# Patient Record
Sex: MALE | Race: WHITE | Employment: FULL TIME | URBAN - METROPOLITAN AREA
[De-identification: names, ages, dates, MRNs, and addresses within clinical notes are randomized per-mention and may not be internally consistent; named-entity substitution may affect disease eponyms.]

---

## 2022-09-11 ENCOUNTER — HOSPITAL ENCOUNTER (EMERGENCY)
Age: 47
Discharge: HOME | End: 2022-09-11
Attending: EMERGENCY MEDICINE

## 2022-09-11 VITALS
HEART RATE: 100 BPM | RESPIRATION RATE: 18 BRPM | TEMPERATURE: 98 F | SYSTOLIC BLOOD PRESSURE: 153 MMHG | DIASTOLIC BLOOD PRESSURE: 83 MMHG | OXYGEN SATURATION: 100 %

## 2022-09-11 DIAGNOSIS — T15.92XA FB EYE, LEFT, INITIAL ENCOUNTER: Primary | ICD-10-CM

## 2022-09-11 PROCEDURE — 6370000000 HC RX 637 (ALT 250 FOR IP): Performed by: PHYSICIAN ASSISTANT

## 2022-09-11 PROCEDURE — 99283 EMERGENCY DEPT VISIT LOW MDM: CPT

## 2022-09-11 RX ORDER — MOXIFLOXACIN 5 MG/ML
SOLUTION/ DROPS OPHTHALMIC
Qty: 1 EACH | Refills: 0 | Status: SHIPPED | OUTPATIENT
Start: 2022-09-11

## 2022-09-11 RX ORDER — TETRACAINE HYDROCHLORIDE 5 MG/ML
1 SOLUTION OPHTHALMIC ONCE
Status: COMPLETED | OUTPATIENT
Start: 2022-09-11 | End: 2022-09-11

## 2022-09-11 RX ADMIN — FLUORESCEIN SODIUM 2 EACH: 0.6 STRIP OPHTHALMIC at 18:48

## 2022-09-11 RX ADMIN — TETRACAINE HYDROCHLORIDE 1 DROP: 5 SOLUTION OPHTHALMIC at 18:48

## 2022-09-11 ASSESSMENT — PAIN DESCRIPTION - PAIN TYPE: TYPE: ACUTE PAIN

## 2022-09-11 ASSESSMENT — VISUAL ACUITY
OD: 20/50
OS: 20/100

## 2022-09-11 ASSESSMENT — LIFESTYLE VARIABLES: HOW OFTEN DO YOU HAVE A DRINK CONTAINING ALCOHOL: NEVER

## 2022-09-11 ASSESSMENT — PAIN DESCRIPTION - ORIENTATION: ORIENTATION: LEFT

## 2022-09-11 ASSESSMENT — PAIN SCALES - GENERAL: PAINLEVEL_OUTOF10: 5

## 2022-09-11 ASSESSMENT — PAIN - FUNCTIONAL ASSESSMENT: PAIN_FUNCTIONAL_ASSESSMENT: 0-10

## 2022-09-11 ASSESSMENT — PAIN DESCRIPTION - LOCATION: LOCATION: EYE

## 2022-09-11 ASSESSMENT — PAIN DESCRIPTION - FREQUENCY: FREQUENCY: INTERMITTENT

## 2022-09-11 NOTE — ED PROVIDER NOTES
ED Attending shared visit  CC: No     Department of Emergency Medicine   ED  Encounter Note  Admit Date/RoomTime: 2022  6:08 PM  ED Room: [place] [address]    NAME: Lorena Gray  : 1975  MRN: [id number]     Chief Complaint:  Eye Pain (Left eye pain intermittent x 1 week, reddened, sensitive to light and swollen)    History of Present Illness        Lorena Gray is a 55 y.o. old male presenting to the emergency department by private vehicle, for traumatic sudden onset erythema, foreign body sensation, pain, photophobia, and tearing to left eye, which began 1 week(s) prior to arrival.  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 Since onset his symptoms have been remaining constant and severe in severity. Associated signs & symptoms of: nothing additional. The patients tetanus status is within past 5 years. History of:     []   Glaucoma     []   Recent Eye Surgery     ROS   Pertinent positives and negatives are stated within HPI, all other systems reviewed and are negative. Past Medical History:  has no past medical history on file. Surgical History:  has no past surgical history on file. Social History:  reports that he has been smoking cigarettes. He has been smoking an average of 2 packs per day. He has never used smokeless tobacco. He reports that he does not currently use alcohol. He reports that he does not use drugs. Family History: family history is not on file. Allergies: Patient has no known allergies. Physical Exam   Oxygen Saturation Interpretation: Normal.        ED Triage Vitals   BP Temp Temp src Heart Rate Resp SpO2 Height Weight   22 1755 22 1736 -- 22 1736 22 1736 22 1736 -- --   137/83 98 °F (36.7 °C)  (!) 120 20 96 %         Physical Exam  Constitutional:  Alert, development consistent with age. HENT:  NC/NT. Airway patent. Neck:  Normal ROM. Supple. Eyes:         Pupils: equal, round, reactive to light and accommodation. Eyelids: Bilateral upper and lower Swelling/redness:  no swelling or erythema. Conjunctiva: Left injected(red). Sclera: Left non-icteric . Cornea: Left FB at the 11 o' clock position with rust ring present. EOM:  Intact Bilaterally. Fundoscopic:  grossly normal- discs sharp. Visual Acuity: 20/50 right, 20/100 left. Integument:  No rashes, erythema present, unless noted elsewhere. Lymphatics: No lymphangitis or adenopathy noted. Neurological:  Oriented. Motor functions intact. **Informed Consent**    The patient has given verbal consent to have photos taken of left eye with FB and electronically inserted into their ED Provider Note as part of their permanent medical record for purposes of illustration, documentation, treatment management and/or medical review. All Images taken on 9/11/22 of patient name: Margie Duran were taken by a [facility] approved registered mobile device via [employer] mobile application and transmitted then stored on a secured [employer] located within [facility]. Lab / Imaging Results   (All laboratory and radiology results have been personally reviewed by myself)  Labs:  No results found for this visit on 09/11/22. Imaging: All Radiology results interpreted by Radiologist unless otherwise noted. No orders to display     ED Course / Medical Decision Making     Medications   tetracaine (TETRAVISC) 0.5 % ophthalmic solution 1 drop (1 drop Left Eye Given 9/11/22 1848)   fluorescein ophthalmic strip 2 each (2 each Both Eyes Given 9/11/22 1848)        Re-examination:  9/11/22       Time: 1845  Patients condition is improving after treatment. Consult(s):   None    Procedure(s):     PROCEDURE  9/11/22       Time: 1830    FOREIGN BODY REMOVAL  Risks, benefits and alternatives (for applicable procedures below) described. Performed By: Anastacio Palumbo PA-C.  And Dr. Aj Manuel    Location:   Foreign body of Left eye. Informed consent: Verbal consent obtained. The patient was counseled regarding the procedure in person, it's indications, risks, potential complications and alternatives and any questions were answered. Verbal consent was obtained. Skin Prep:  none  required. Local Anesthesia:  tetracaine. The patient's head was positioned appropriately and the foreign body was removed using 18 gauge needle. .  Complications: none. Patient tolerated the procedure well. MDM:   Patient presents to the emergency department for suspected foreign body of the left eye. Foreign body was identified at the 11 o'clock position, which was easily removed in the ER. There was a rust ring present after the procedure was complete and the patient understands that he must follow-up with ophthalmology to have this removed. Patient will be given moxifloxacin and should call ophthalmology tomorrow to schedule close follow-up appointment. Return for any new or worsening symptoms. Plan of Care/Counseling:  Geoffrey Holter, PA-C reviewed today's visit with the patient in addition to providing specific details for the plan of care and counseling regarding the diagnosis and prognosis. Questions are answered at this time and are agreeable with the plan. Assessment      1. FB eye, left, initial encounter      Plan   Discharged home. Patient condition is good    New Medications     New Prescriptions    MOXIFLOXACIN (VIGAMOX) 0.5 % OPHTHALMIC SOLUTION    Use 2 drops to affected eye TID for 5 days     Electronically signed by Geoffrey Holter, PA-C   DD: 9/11/22  **This report was transcribed using voice recognition software. Every effort was made to ensure accuracy; however, inadvertent computerized transcription errors may be present.   END OF ED PROVIDER NOTE        Geoffrey Holter, PA-C  09/11/22 1432

## 2022-09-11 NOTE — Clinical Note
Rabia Gonzales was seen and treated in our emergency department on 9/11/2022. He may return to work on 09/12/2022. If you have any questions or concerns, please don't hesitate to call.       Dasia Chacon PA-C

## 2022-11-02 ENCOUNTER — HOSPITAL ENCOUNTER (EMERGENCY)
Age: 47
Discharge: HOME | End: 2022-11-02
Attending: EMERGENCY MEDICINE

## 2022-11-02 VITALS
SYSTOLIC BLOOD PRESSURE: 142 MMHG | RESPIRATION RATE: 16 BRPM | OXYGEN SATURATION: 100 % | HEART RATE: 97 BPM | DIASTOLIC BLOOD PRESSURE: 96 MMHG | TEMPERATURE: 98.5 F

## 2022-11-02 DIAGNOSIS — T15.92XA FB EYE, LEFT, INITIAL ENCOUNTER: Primary | ICD-10-CM

## 2022-11-02 PROCEDURE — 99283 EMERGENCY DEPT VISIT LOW MDM: CPT

## 2022-11-02 PROCEDURE — 65222 REMOVE FOREIGN BODY FROM EYE: CPT

## 2022-11-02 PROCEDURE — 6370000000 HC RX 637 (ALT 250 FOR IP): Performed by: PHYSICIAN ASSISTANT

## 2022-11-02 RX ORDER — TETRACAINE HYDROCHLORIDE 5 MG/ML
2 SOLUTION OPHTHALMIC ONCE
Status: COMPLETED | OUTPATIENT
Start: 2022-11-02 | End: 2022-11-02

## 2022-11-02 RX ORDER — KETOROLAC TROMETHAMINE 5 MG/ML
1 SOLUTION OPHTHALMIC 4 TIMES DAILY
Qty: 5 ML | Refills: 0 | Status: SHIPPED | OUTPATIENT
Start: 2022-11-02 | End: 2022-11-09

## 2022-11-02 RX ORDER — ERYTHROMYCIN 5 MG/G
OINTMENT OPHTHALMIC
Qty: 3.5 G | Refills: 0 | Status: SHIPPED | OUTPATIENT
Start: 2022-11-02 | End: 2022-11-12

## 2022-11-02 RX ORDER — ERYTHROMYCIN 5 MG/G
OINTMENT OPHTHALMIC ONCE
Status: COMPLETED | OUTPATIENT
Start: 2022-11-02 | End: 2022-11-02

## 2022-11-02 RX ADMIN — FLUORESCEIN SODIUM 1 EACH: 0.6 STRIP OPHTHALMIC at 09:23

## 2022-11-02 RX ADMIN — TETRACAINE HYDROCHLORIDE 2 DROP: 5 SOLUTION OPHTHALMIC at 09:23

## 2022-11-02 RX ADMIN — ERYTHROMYCIN: 5 OINTMENT OPHTHALMIC at 09:23

## 2022-11-02 ASSESSMENT — PAIN - FUNCTIONAL ASSESSMENT: PAIN_FUNCTIONAL_ASSESSMENT: 0-10

## 2022-11-02 ASSESSMENT — PAIN SCALES - GENERAL: PAINLEVEL_OUTOF10: 3

## 2022-11-02 NOTE — Clinical Note
David Perales was seen and treated in our emergency department on 11/2/2022. He may return to work on 11/03/2022. If you have any questions or concerns, please don't hesitate to call.       VIDAL Easton

## 2022-11-02 NOTE — ED NOTES
Visual acuity completed on patient. Left eye 20/30  Right eye 20/100  Patient does state that he wear glasses always but does not have them right now. PA notified.       Mónica Westbrook RN  11/02/22 1013

## 2022-11-02 NOTE — ED PROVIDER NOTES
ED Attending shared visit  CC: No     HPI:  11/2/22, Time: 8:44 AM EDT         Royal Sanders is a 52 y.o. male presenting to the ED for left eye pain erythema , beginning 4 Days  ago. The complaint has been persistent, moderate in severity, and worsened by light . patient comes in with complaint of left eye pain and erythema that started 4 days ago. He states he had a foreign body in the eye approximately 2 weeks ago was placed on antibiotic at that time and symptoms did resolve. He did not follow-up with ophthalmology. Now having sharp shooting pain of the eye with some sensitivity to light. He denies any pain behind the eye. The eye pain does cause him to have headache with some blurring of vision. No loss of vision. Denies any drainage from the eye. Patient does not wear any contacts. Unsure of any recent foreign body to the eye. No injury to the eye. Denies any fever congestion cough. Patient's tetanus is up-to-date. Review of Systems:   A complete review of systems was performed and pertinent positives and negatives are stated within HPI, all other systems reviewed and are negative.          --------------------------------------------- PAST HISTORY ---------------------------------------------  Past Medical History:  has no past medical history on file. Past Surgical History:  has no past surgical history on file. Social History:  reports that he has been smoking cigarettes. He has been smoking an average of 2 packs per day. He has never used smokeless tobacco. He reports that he does not currently use alcohol. He reports that he does not use drugs. Family History: family history is not on file. The patients home medications have been reviewed. Allergies: Patient has no known allergies.     -------------------------------------------------- RESULTS -------------------------------------------------  All laboratory and radiology results have been personally reviewed by myself   LABS:  No results found for this visit on 11/02/22. RADIOLOGY:  Interpreted by Radiologist.  No orders to display       ------------------------- NURSING NOTES AND VITALS REVIEWED ---------------------------   The nursing notes within the ED encounter and vital signs as below have been reviewed. BP (!) 142/96   Pulse 97   Temp 98.5 °F (36.9 °C)   Resp 16   SpO2 100%   Oxygen Saturation Interpretation: Normal      ---------------------------------------------------PHYSICAL EXAM--------------------------------------      Constitutional/General: Alert and oriented x3, well appearing, non toxic in NAD  Head: Normocephalic and atraumatic  Eyes: PERRL, EOMI left eye with conjunctival erythema there is fluorescein uptake at 2:00 with foreign body present 2:00 laterally to the pupil. Eyelids were everted no foreign bodies present left eye pressure is 8 right eye pressure of 11  Mouth: Oropharynx clear, handling secretions, no trismus  Neck: Supple, full ROM,   Pulmonary: Lungs clear to auscultation bilaterally, no wheezes, rales, or rhonchi. Not in respiratory distress  Cardiovascular:  Regular rate and rhythm, no murmurs, gallops, or rubs. 2+ distal pulses  Abdomen: Soft, non tender, non distended,   Extremities: Moves all extremities x 4.  Warm and well perfused  Skin: warm and dry without rash  Neurologic: GCS 15,  Psych: Normal Affect      ------------------------------ ED COURSE/MEDICAL DECISION MAKING----------------------  Medications   tetracaine (TETRAVISC) 0.5 % ophthalmic solution 2 drop (2 drops Ophthalmic Given 11/2/22 0923)   erythromycin [facility]) ophthalmic ointment ( Left Eye Given 11/2/22 7950)   fluorescein ophthalmic strip 1 each (1 each Right Eye Given 11/2/22 9282)         ED COURSE:  ED Course as of 11/02/22 1058   Wed Nov 02, 2022   [facility]:     I have personally performed and/or participated in the history, exam, medical decision making, and procedures and agree with all pertinent clinical information unless otherwise noted. I have also reviewed and agree with the past medical, family and social history unless otherwise noted. History: OS foreign body    My findings: Conrado Singh is a 52 y.o. male whom is in no distress. Physical exam reveals erythema of the conjunctiva. No discharge. EOMI. No facial erythema or edema. My plan: Symptomatic and supportive care. Patient had a FB removed by the PA. He will be placed on antibiotic ointment and will follow up with optho. [JS]      ED Course User Index  [JS] Radha Garcai DO     SLIT LAMP EXAM:  Performed By: Harris Severe, PA. Left Eye: Cornea: a 1.0 to 2.0 mm foreign body with the appearance of metal was removed using an eye spud. Flourescein stain: Negative. Anterior chamber: normal.        Medical Decision Making:    Patient comes in with complaint of left thigh erythema pain that started 4 days ago. He had history of foreign body of the left eye at 11:00 2 weeks ago that he states did improve he had not followed up with ophthalmology today patient with foreign body of the left eye at approximately 2:00 removed with an eye bur. Patient was placed on erythromycin and is to follow-up with ophthalmology today or tomorrow. Patient was not wearing his prescription glasses secondary to scratches of the glass for visual acuity. Counseling: The emergency provider has spoken with the patient and discussed todays results, in addition to providing specific details for the plan of care and counseling regarding the diagnosis and prognosis.   Questions are answered at this time and they are agreeable with the plan.      --------------------------------- IMPRESSION AND DISPOSITION ---------------------------------    IMPRESSION  1. FB eye, left, initial encounter        DISPOSITION  Disposition: Discharge to home  Patient condition is good      NOTE: This report was transcribed using voice recognition software.  Every effort was made to ensure accuracy; however, inadvertent computerized transcription errors may be present      [place], [place]  11/02/22 8129

## 2023-08-21 ENCOUNTER — HOSPITAL ENCOUNTER (EMERGENCY)
Age: 48
Discharge: HOME | End: 2023-08-21
Attending: STUDENT IN AN ORGANIZED HEALTH CARE EDUCATION/TRAINING PROGRAM

## 2023-08-21 VITALS
HEIGHT: 63 IN | SYSTOLIC BLOOD PRESSURE: 133 MMHG | HEART RATE: 106 BPM | WEIGHT: 165 LBS | RESPIRATION RATE: 16 BRPM | DIASTOLIC BLOOD PRESSURE: 83 MMHG | BODY MASS INDEX: 29.23 KG/M2 | TEMPERATURE: 98.4 F | OXYGEN SATURATION: 98 %

## 2023-08-21 DIAGNOSIS — S05.01XA BILATERAL CORNEAL ABRASIONS, INITIAL ENCOUNTER: ICD-10-CM

## 2023-08-21 DIAGNOSIS — S05.02XA BILATERAL CORNEAL ABRASIONS, INITIAL ENCOUNTER: ICD-10-CM

## 2023-08-21 DIAGNOSIS — S05.52XA: Primary | ICD-10-CM

## 2023-08-21 DIAGNOSIS — S05.51XA: Primary | ICD-10-CM

## 2023-08-21 PROCEDURE — 6370000000 HC RX 637 (ALT 250 FOR IP): Performed by: STUDENT IN AN ORGANIZED HEALTH CARE EDUCATION/TRAINING PROGRAM

## 2023-08-21 PROCEDURE — 99283 EMERGENCY DEPT VISIT LOW MDM: CPT

## 2023-08-21 RX ORDER — ERYTHROMYCIN 5 MG/G
OINTMENT OPHTHALMIC
Status: COMPLETED | OUTPATIENT
Start: 2023-08-21 | End: 2023-08-21

## 2023-08-21 RX ADMIN — FLUORESCEIN SODIUM 1 MG: 1 STRIP OPHTHALMIC at 04:31

## 2023-08-21 RX ADMIN — ERYTHROMYCIN: 5 OINTMENT OPHTHALMIC at 04:44

## 2023-08-21 ASSESSMENT — PAIN DESCRIPTION - ORIENTATION
ORIENTATION: RIGHT;LEFT
ORIENTATION: RIGHT;LEFT

## 2023-08-21 ASSESSMENT — PAIN - FUNCTIONAL ASSESSMENT
PAIN_FUNCTIONAL_ASSESSMENT: 0-10
PAIN_FUNCTIONAL_ASSESSMENT: 0-10

## 2023-08-21 ASSESSMENT — PAIN DESCRIPTION - DESCRIPTORS: DESCRIPTORS: BURNING

## 2023-08-21 ASSESSMENT — PAIN DESCRIPTION - PAIN TYPE: TYPE: ACUTE PAIN

## 2023-08-21 ASSESSMENT — PAIN SCALES - GENERAL
PAINLEVEL_OUTOF10: 10
PAINLEVEL_OUTOF10: 8

## 2023-08-21 ASSESSMENT — PAIN DESCRIPTION - LOCATION
LOCATION: EYE
LOCATION: EYE

## 2023-08-21 ASSESSMENT — PAIN DESCRIPTION - FREQUENCY: FREQUENCY: CONTINUOUS

## 2023-08-21 NOTE — ED PROVIDER NOTES
Department of Emergency Medicine     Written by: Shilpa Wallace DO  Patient Name: Kendrick Uribe Date: 2023  3:56 AM  MRN: [id number]                   : 1975      HPI  Chief Complaint   Patient presents with    Eye Problem     Bilateral redness and burning to eyes. Pt denies itchiness and reports gooey drainage two days ago. Pt thought he had something in his right eye and got it out. This a 51-year-old male with no relevant past medical history on file who presents the emergency department today complaining of bilateral eye pain. Patient states approximately 3 days ago he noticed some right eye pain and thought he had something in his eye. 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 He had some discharge out of the right approximately 2 days ago but he does not recall what it looked like. This morning he was in the garage doing some metal grinding again and then noticed he had severe pain and burning with redness of both eyes. Denies headache, chest pain, shortness of breath, abdominal pain, dizziness, lightheadedness. He states his last tetanus shot was about 8 or 9 months ago. He does not wear contact lenses. Review of systems:    Pertinent positives and negatives mentioned in the HPI/MDM. Physical Exam  Constitutional:       General: He is not in acute distress. HENT:      Head: Normocephalic and atraumatic. Eyes:      Extraocular Movements: Extraocular movements intact. Conjunctiva/sclera:      Right eye: Right conjunctiva is injected. Left eye: Left conjunctiva is injected. Pupils: Pupils are equal, round, and reactive to light. Right eye: Fluorescein uptake present. Comments: Multiple corneal abrasions noted bilaterally as well as small dark blackish flakes/foreign body on the conjunctiva   Cardiovascular:      Rate and Rhythm: Normal rate and regular rhythm.    Pulmonary:      Effort: Pulmonary effort is

## 2023-08-21 NOTE — DISCHARGE INSTRUCTIONS
Call the ophthalmologist as soon as they open, if they are unable to see you please go to [facility] emergency department so that ophthalmologist there may evaluate you. Apply half-inch ribbon of erythromycin ointment to both lower eyelids 4 times daily.

## 2024-01-30 PROBLEM — R03.0 BORDERLINE HIGH BLOOD PRESSURE: Status: ACTIVE | Noted: 2024-01-30

## 2024-01-30 PROBLEM — G89.29 CHRONIC BACK PAIN: Status: ACTIVE | Noted: 2024-01-30

## 2024-01-30 PROBLEM — S22.080A T12 COMPRESSION FRACTURE (MULTI): Status: ACTIVE | Noted: 2024-01-30

## 2024-01-30 PROBLEM — M54.9 CHRONIC BACK PAIN: Status: ACTIVE | Noted: 2024-01-30

## 2024-01-30 PROBLEM — F17.200 NICOTINE DEPENDENCE: Status: ACTIVE | Noted: 2024-01-30

## 2024-01-30 PROBLEM — F32.A DEPRESSION: Status: ACTIVE | Noted: 2024-01-30

## 2024-06-26 ENCOUNTER — APPOINTMENT (OUTPATIENT)
Dept: RADIOLOGY | Facility: HOSPITAL | Age: 49
End: 2024-06-26
Payer: COMMERCIAL

## 2024-06-26 ENCOUNTER — HOSPITAL ENCOUNTER (EMERGENCY)
Facility: HOSPITAL | Age: 49
Discharge: HOME | End: 2024-06-26
Payer: COMMERCIAL

## 2024-06-26 VITALS
RESPIRATION RATE: 18 BRPM | HEART RATE: 89 BPM | WEIGHT: 150 LBS | HEIGHT: 63 IN | TEMPERATURE: 98.2 F | DIASTOLIC BLOOD PRESSURE: 101 MMHG | SYSTOLIC BLOOD PRESSURE: 143 MMHG | OXYGEN SATURATION: 98 % | BODY MASS INDEX: 26.58 KG/M2

## 2024-06-26 DIAGNOSIS — M54.12 CERVICAL RADICULOPATHY: ICD-10-CM

## 2024-06-26 DIAGNOSIS — I15.9 SECONDARY HYPERTENSION: Primary | ICD-10-CM

## 2024-06-26 DIAGNOSIS — M48.02 SPINAL STENOSIS, CERVICAL REGION: ICD-10-CM

## 2024-06-26 PROCEDURE — 72125 CT NECK SPINE W/O DYE: CPT

## 2024-06-26 PROCEDURE — 73030 X-RAY EXAM OF SHOULDER: CPT | Mod: RIGHT SIDE | Performed by: RADIOLOGY

## 2024-06-26 PROCEDURE — 99285 EMERGENCY DEPT VISIT HI MDM: CPT | Mod: 25

## 2024-06-26 PROCEDURE — 70450 CT HEAD/BRAIN W/O DYE: CPT

## 2024-06-26 PROCEDURE — 73030 X-RAY EXAM OF SHOULDER: CPT | Mod: RT

## 2024-06-26 PROCEDURE — 70450 CT HEAD/BRAIN W/O DYE: CPT | Performed by: RADIOLOGY

## 2024-06-26 PROCEDURE — 72125 CT NECK SPINE W/O DYE: CPT | Performed by: RADIOLOGY

## 2024-06-26 PROCEDURE — 2500000004 HC RX 250 GENERAL PHARMACY W/ HCPCS (ALT 636 FOR OP/ED): Performed by: NURSE PRACTITIONER

## 2024-06-26 PROCEDURE — 96372 THER/PROPH/DIAG INJ SC/IM: CPT | Performed by: NURSE PRACTITIONER

## 2024-06-26 RX ORDER — TIZANIDINE 2 MG/1
4 TABLET ORAL 3 TIMES DAILY PRN
Qty: 180 TABLET | Refills: 0 | Status: SHIPPED | OUTPATIENT
Start: 2024-06-26 | End: 2024-07-26

## 2024-06-26 RX ORDER — PREDNISONE 20 MG/1
20 TABLET ORAL DAILY
Qty: 5 TABLET | Refills: 0 | Status: SHIPPED | OUTPATIENT
Start: 2024-06-26 | End: 2024-07-01

## 2024-06-26 RX ORDER — PREDNISONE 20 MG/1
20 TABLET ORAL ONCE
Status: COMPLETED | OUTPATIENT
Start: 2024-06-26 | End: 2024-06-26

## 2024-06-26 RX ORDER — NAPROXEN 500 MG/1
500 TABLET ORAL
Qty: 20 TABLET | Refills: 0 | Status: SHIPPED | OUTPATIENT
Start: 2024-06-26 | End: 2024-07-06

## 2024-06-26 RX ORDER — KETOROLAC TROMETHAMINE 30 MG/ML
30 INJECTION, SOLUTION INTRAMUSCULAR; INTRAVENOUS ONCE
Status: COMPLETED | OUTPATIENT
Start: 2024-06-26 | End: 2024-06-26

## 2024-06-26 RX ORDER — ORPHENADRINE CITRATE 30 MG/ML
60 INJECTION INTRAMUSCULAR; INTRAVENOUS ONCE
Status: COMPLETED | OUTPATIENT
Start: 2024-06-26 | End: 2024-06-26

## 2024-06-26 ASSESSMENT — PAIN DESCRIPTION - LOCATION: LOCATION: ARM

## 2024-06-26 ASSESSMENT — LIFESTYLE VARIABLES
EVER HAD A DRINK FIRST THING IN THE MORNING TO STEADY YOUR NERVES TO GET RID OF A HANGOVER: NO
EVER FELT BAD OR GUILTY ABOUT YOUR DRINKING: NO
HAVE PEOPLE ANNOYED YOU BY CRITICIZING YOUR DRINKING: NO
TOTAL SCORE: 0
HAVE YOU EVER FELT YOU SHOULD CUT DOWN ON YOUR DRINKING: NO

## 2024-06-26 ASSESSMENT — PAIN - FUNCTIONAL ASSESSMENT: PAIN_FUNCTIONAL_ASSESSMENT: 0-10

## 2024-06-26 ASSESSMENT — PAIN DESCRIPTION - ORIENTATION: ORIENTATION: RIGHT

## 2024-06-26 ASSESSMENT — PAIN DESCRIPTION - PROGRESSION: CLINICAL_PROGRESSION: NOT CHANGED

## 2024-06-26 ASSESSMENT — COLUMBIA-SUICIDE SEVERITY RATING SCALE - C-SSRS
1. IN THE PAST MONTH, HAVE YOU WISHED YOU WERE DEAD OR WISHED YOU COULD GO TO SLEEP AND NOT WAKE UP?: NO
6. HAVE YOU EVER DONE ANYTHING, STARTED TO DO ANYTHING, OR PREPARED TO DO ANYTHING TO END YOUR LIFE?: NO
2. HAVE YOU ACTUALLY HAD ANY THOUGHTS OF KILLING YOURSELF?: NO

## 2024-06-26 ASSESSMENT — PAIN DESCRIPTION - DESCRIPTORS: DESCRIPTORS: ACHING

## 2024-06-26 ASSESSMENT — PAIN SCALES - GENERAL: PAINLEVEL_OUTOF10: 7

## 2024-06-26 ASSESSMENT — PAIN DESCRIPTION - PAIN TYPE: TYPE: ACUTE PAIN

## 2024-06-26 NOTE — ED PROVIDER NOTES
HPI   Chief Complaint   Patient presents with    Numbness     Pt presents to the ER with numbness, burning, and pain to his right arm. Pt states this has been going on for several months. PT states that he can hold objects anymore without the pain being unbearable. PT has not been seen for this and does not have a PCP. PT denies any other complaints at this time. PT has had shoulder surgery on that right arm before but denies any injury. Msps are intact and patient is CINN stroke scale negative.        48-year-old male presents today with acute right arm pain and numbness x 2 months but in the last 2 weeks he is experienced burning in the right arm with the numbness.  He is also experienced vision change for the last 2 weeks.  However, patient lost his glasses 6 months ago when they were broken and has been unable to replace them.  His primary care physician retired and he does not have a new primary care physician or an ophthalmologist.  He was hypertensive in triage with a blood pressure 198/98 and a prior BP was 153/98.  His heart rate was 110 bpm and patient attributes this to anxiety and feeling nervous when he [place] the emergency department.  He denies confusion.  He denies pharyngitis.  He denies fever or chills.  He denies chest pain or dyspnea.  He denies unilateral weakness.  He denies abdominal pain, nausea, or vomiting.  He has not taken any medication for the pain.  He denies any allergies to medication.  He has a remote history of shoulder repair surgery on the right side 12 years ago.  He has been told that he has bulging disks of the cervical spine.  He quit  and drinking alcohol 5 years ago.  He does not smoke marijuana.      History provided by:  Patient   used: No                        [place] Coma Scale Score: 15         NIH Stroke Scale: 0             Patient History   Past Medical History:   Diagnosis Date    Alcohol dependence, in remission (Multi) 06/22/2020    Alcohol  use disorder, moderate, in early remission    Other conditions influencing health status 08/03/2016    Sprain and strain of shoulder and upper arm    Other specified postprocedural states 01/06/2021    Status post ORIF of fracture of ankle    Otitis media, unspecified, left ear 02/14/2018    Acute left otitis media    Pain in unspecified shoulder 08/09/2017    Shoulder pain    Personal history of (healed) traumatic fracture 01/06/2021    History of fracture of left ankle    Personal history of other diseases of the musculoskeletal system and connective tissue 02/14/2018    History of neck pain    Personal history of other diseases of the musculoskeletal system and connective tissue 10/17/2014    History of low back pain    Personal history of other diseases of the respiratory system 02/14/2018    History of acute bronchitis    Personal history of other specified conditions 02/15/2019    History of palpitations    Unspecified injury of left ankle, initial encounter 11/18/2020    Left ankle injury     Past Surgical History:   Procedure Laterality Date    BACK SURGERY  05/06/2014    Back Surgery    OTHER SURGICAL HISTORY  06/22/2020    [place] tooth extraction    ROTATOR CUFF REPAIR  05/06/2014    Rotator Cuff Repair     No family history on file.  Social History     Tobacco Use    Smoking status: Not on file    Smokeless tobacco: Not on file   Substance Use Topics    Alcohol use: Not on file    Drug use: Not on file       Physical Exam   ED Triage Vitals [06/26/24 1440]   Temperature Heart Rate Respirations BP   36.8 °C (98.2 °F) (!) 110 20 (!) 153/98      Pulse Ox Temp Source Heart Rate Source Patient Position   98 % Temporal Monitor Sitting      BP Location FiO2 (%)     Left arm --       Physical Exam  Constitutional:       Appearance: Normal appearance.   HENT:      Head: Normocephalic and atraumatic.      Right Ear: Tympanic membrane normal.      Left Ear: Tympanic membrane normal.      Nose: Nose normal.       Mouth/Throat:      Mouth: Mucous membranes are moist.   Eyes:      Pupils: Pupils are equal, round, and reactive to light.   Cardiovascular:      Rate and Rhythm: Normal rate and regular rhythm.      Pulses: Normal pulses.      Heart sounds: Normal heart sounds.   Pulmonary:      Effort: Pulmonary effort is normal.      Breath sounds: Normal breath sounds.   Abdominal:      General: Abdomen is flat.      Palpations: Abdomen is soft.   Musculoskeletal:         General: Tenderness present. Normal range of motion.      Cervical back: Normal range of motion and neck supple. Tenderness present.   Skin:     General: Skin is warm.      Capillary Refill: Capillary refill takes less than 2 seconds.   Neurological:      General: No focal deficit present.      Mental Status: He is alert and oriented to person, place, and time.      Cranial Nerves: No cranial nerve deficit.      Sensory: No sensory deficit.      Motor: No weakness.      Coordination: Coordination normal.   Psychiatric:         Mood and Affect: Mood normal.         ED Course & MDM   Diagnoses as of 06/26/24 1609   Secondary hypertension   Cervical radiculopathy   Spinal stenosis, cervical region       Medical Decision Making  Patient received Toradol, Norflex, and prednisone.  CT head was ordered due to the vision change.  CT cervical due to the right arm burning and numbness.  X-ray of right shoulder was ordered.  Patient responded well to Toradol Norflex and prednisone.  He was already feeling better.  His blood pressure had lowered to 143/101.  I requested appointment with new primary.  Patient gave him contact information for and gave him information on hypertension.  CT of the cervical spine was concerning for spinal stenosis of the cervical spine osteoarthritis.  This could be leading to cervical radiculopathy which is why is experiencing this right arm numbness and burning for the last couple months.  He was placed on prednisone, tizanidine, and Naprosyn.   He understands the importance of following up with Dr. Mateusz Rausch and I requested appointment with Dr. Mateusz Rausch's office at spine surgery.  He understands that he needs to reestablish new primary care.  At this time I did not start him on any antihypertensive medication but I gave him information on hypertension and also DASH diet.  He understands the importance of returning if he develops an acute headache or confusion or vision change he needs to follow-up with ophthalmology to get a new set of glasses.    Amount and/or Complexity of Data Reviewed  Radiology: ordered and independent interpretation performed.        Procedure  Procedures     CANDACE Thomas-CNP  06/26/24 1608       CANDACE Thomas-CNP  06/26/24 1608

## 2024-06-26 NOTE — ED NOTES
Pt arrives with c/o burning pain to right shoulder, radiating down to right hand ongoing for several months. Pt additionally reports previous hx of bulging discs approx 15 years ago. Pt denies neck pain at this time. Pt additionally c/o intermittent blurred vision bilaterally, onset approx 2 weeks PTA. Pt currently in no obvious distress.      Boone Jacobs RN  06/26/24 6137

## 2024-06-28 PROBLEM — G47.00 INSOMNIA: Status: ACTIVE | Noted: 2024-06-28

## 2024-06-28 PROBLEM — S82.899A FRACTURE OF ANKLE: Status: ACTIVE | Noted: 2024-06-28

## 2024-06-28 PROBLEM — S82.843A ANKLE FRACTURE, BIMALLEOLAR, CLOSED: Status: ACTIVE | Noted: 2020-10-05

## 2024-06-28 PROBLEM — R00.2 PALPITATIONS: Status: ACTIVE | Noted: 2024-06-28

## 2024-06-28 PROBLEM — H53.8 BLURRING OF VISUAL IMAGE: Status: ACTIVE | Noted: 2024-06-28

## 2024-06-28 PROBLEM — S22.000A COMPRESSION FRACTURE OF THORACIC VERTEBRA (MULTI): Status: ACTIVE | Noted: 2024-06-28

## 2024-06-28 RX ORDER — CYCLOBENZAPRINE HCL 10 MG
1 TABLET ORAL NIGHTLY PRN
COMMUNITY
Start: 2018-05-14

## 2024-06-28 RX ORDER — DOCUSATE SODIUM 100 MG/1
CAPSULE, LIQUID FILLED ORAL EVERY 12 HOURS
COMMUNITY
Start: 2020-10-15

## 2024-06-28 RX ORDER — BUPRENORPHINE AND NALOXONE 8; 2 MG/1; MG/1
FILM, SOLUBLE BUCCAL; SUBLINGUAL
COMMUNITY

## 2024-07-11 ENCOUNTER — APPOINTMENT (OUTPATIENT)
Dept: PRIMARY CARE | Facility: CLINIC | Age: 49
End: 2024-07-11
Payer: COMMERCIAL

## 2024-07-11 VITALS
OXYGEN SATURATION: 98 % | SYSTOLIC BLOOD PRESSURE: 138 MMHG | HEIGHT: 63 IN | DIASTOLIC BLOOD PRESSURE: 82 MMHG | TEMPERATURE: 98.2 F | WEIGHT: 136 LBS | HEART RATE: 98 BPM | BODY MASS INDEX: 24.1 KG/M2

## 2024-07-11 DIAGNOSIS — I10 PRIMARY HYPERTENSION: ICD-10-CM

## 2024-07-11 DIAGNOSIS — I15.9 SECONDARY HYPERTENSION: ICD-10-CM

## 2024-07-11 DIAGNOSIS — Z83.3 FAMILY HISTORY OF DIABETES MELLITUS: ICD-10-CM

## 2024-07-11 DIAGNOSIS — M54.9 CHRONIC BACK PAIN, UNSPECIFIED BACK LOCATION, UNSPECIFIED BACK PAIN LATERALITY: Primary | ICD-10-CM

## 2024-07-11 DIAGNOSIS — E78.5 HYPERLIPIDEMIA, UNSPECIFIED HYPERLIPIDEMIA TYPE: ICD-10-CM

## 2024-07-11 DIAGNOSIS — G89.29 CHRONIC BACK PAIN, UNSPECIFIED BACK LOCATION, UNSPECIFIED BACK PAIN LATERALITY: Primary | ICD-10-CM

## 2024-07-11 DIAGNOSIS — Z12.11 SCREENING FOR COLON CANCER: ICD-10-CM

## 2024-07-11 LAB
ALBUMIN SERPL BCP-MCNC: 3.9 G/DL (ref 3.4–5)
ALP SERPL-CCNC: 71 U/L (ref 33–120)
ALT SERPL W P-5'-P-CCNC: 18 U/L (ref 10–52)
ANION GAP SERPL CALC-SCNC: 12 MMOL/L (ref 10–20)
AST SERPL W P-5'-P-CCNC: 14 U/L (ref 9–39)
BASOPHILS # BLD AUTO: 0.09 X10*3/UL (ref 0–0.1)
BASOPHILS NFR BLD AUTO: 0.7 %
BILIRUB SERPL-MCNC: 0.3 MG/DL (ref 0–1.2)
BUN SERPL-MCNC: 21 MG/DL (ref 6–23)
CALCIUM SERPL-MCNC: 9 MG/DL (ref 8.6–10.3)
CHLORIDE SERPL-SCNC: 103 MMOL/L (ref 98–107)
CHOLEST SERPL-MCNC: 184 MG/DL (ref 0–199)
CHOLESTEROL/HDL RATIO: 2.2
CO2 SERPL-SCNC: 28 MMOL/L (ref 21–32)
CREAT SERPL-MCNC: 1.01 MG/DL (ref 0.5–1.3)
EGFRCR SERPLBLD CKD-EPI 2021: >90 ML/MIN/1.73M*2
EOSINOPHIL # BLD AUTO: 0.18 X10*3/UL (ref 0–0.7)
EOSINOPHIL NFR BLD AUTO: 1.4 %
ERYTHROCYTE [DISTWIDTH] IN BLOOD BY AUTOMATED COUNT: 11.9 % (ref 11.5–14.5)
GLUCOSE SERPL-MCNC: 80 MG/DL (ref 74–99)
HCT VFR BLD AUTO: 43.6 % (ref 41–52)
HDLC SERPL-MCNC: 82.7 MG/DL
HGB BLD-MCNC: 14.8 G/DL (ref 13.5–17.5)
IMM GRANULOCYTES # BLD AUTO: 0.06 X10*3/UL (ref 0–0.7)
IMM GRANULOCYTES NFR BLD AUTO: 0.5 % (ref 0–0.9)
LDLC SERPL CALC-MCNC: 92 MG/DL
LYMPHOCYTES # BLD AUTO: 1.71 X10*3/UL (ref 1.2–4.8)
LYMPHOCYTES NFR BLD AUTO: 12.9 %
MCH RBC QN AUTO: 31.4 PG (ref 26–34)
MCHC RBC AUTO-ENTMCNC: 33.9 G/DL (ref 32–36)
MCV RBC AUTO: 92 FL (ref 80–100)
MONOCYTES # BLD AUTO: 0.92 X10*3/UL (ref 0.1–1)
MONOCYTES NFR BLD AUTO: 7 %
NEUTROPHILS # BLD AUTO: 10.26 X10*3/UL (ref 1.2–7.7)
NEUTROPHILS NFR BLD AUTO: 77.5 %
NON HDL CHOLESTEROL: 101 MG/DL (ref 0–149)
NRBC BLD-RTO: 0 /100 WBCS (ref 0–0)
PLATELET # BLD AUTO: 313 X10*3/UL (ref 150–450)
POTASSIUM SERPL-SCNC: 4.6 MMOL/L (ref 3.5–5.3)
PROT SERPL-MCNC: 6.2 G/DL (ref 6.4–8.2)
RBC # BLD AUTO: 4.72 X10*6/UL (ref 4.5–5.9)
SODIUM SERPL-SCNC: 138 MMOL/L (ref 136–145)
TRIGL SERPL-MCNC: 45 MG/DL (ref 0–149)
VLDL: 9 MG/DL (ref 0–40)
WBC # BLD AUTO: 13.2 X10*3/UL (ref 4.4–11.3)

## 2024-07-11 PROCEDURE — 3079F DIAST BP 80-89 MM HG: CPT | Performed by: NURSE PRACTITIONER

## 2024-07-11 PROCEDURE — 83036 HEMOGLOBIN GLYCOSYLATED A1C: CPT

## 2024-07-11 PROCEDURE — 80053 COMPREHEN METABOLIC PANEL: CPT

## 2024-07-11 PROCEDURE — 3075F SYST BP GE 130 - 139MM HG: CPT | Performed by: NURSE PRACTITIONER

## 2024-07-11 PROCEDURE — 85025 COMPLETE CBC W/AUTO DIFF WBC: CPT

## 2024-07-11 PROCEDURE — 80061 LIPID PANEL: CPT

## 2024-07-11 PROCEDURE — 36415 COLL VENOUS BLD VENIPUNCTURE: CPT

## 2024-07-11 PROCEDURE — 99214 OFFICE O/P EST MOD 30 MIN: CPT | Performed by: NURSE PRACTITIONER

## 2024-07-11 RX ORDER — LISINOPRIL 10 MG/1
10 TABLET ORAL DAILY
Qty: 30 TABLET | Refills: 1 | Status: SHIPPED | OUTPATIENT
Start: 2024-07-11 | End: 2024-09-09

## 2024-07-11 ASSESSMENT — ENCOUNTER SYMPTOMS
DEPRESSION: 0
GASTROINTESTINAL NEGATIVE: 1
NECK STIFFNESS: 0
OCCASIONAL FEELINGS OF UNSTEADINESS: 0
UNEXPECTED WEIGHT CHANGE: 1
ALLERGIC/IMMUNOLOGIC NEGATIVE: 1
WHEEZING: 0
BRUISES/BLEEDS EASILY: 0
DYSURIA: 0
PALPITATIONS: 0
NECK PAIN: 0
CHEST TIGHTNESS: 0
CARDIOVASCULAR NEGATIVE: 1
ENDOCRINE NEGATIVE: 1
FATIGUE: 1
WEAKNESS: 1
TREMORS: 0
CONSTIPATION: 0
RESPIRATORY NEGATIVE: 1
EYE REDNESS: 0
HEMATURIA: 0
HEADACHES: 0
FREQUENCY: 0
BLOOD IN STOOL: 0
JOINT SWELLING: 0
ARTHRALGIAS: 0
DIFFICULTY URINATING: 0
LOSS OF SENSATION IN FEET: 0
ABDOMINAL PAIN: 0
DIARRHEA: 0
SHORTNESS OF BREATH: 0
ACTIVITY CHANGE: 1
DIZZINESS: 0
APPETITE CHANGE: 1
ADENOPATHY: 0
EYE DISCHARGE: 0
NERVOUS/ANXIOUS: 0
LIGHT-HEADEDNESS: 0
SORE THROAT: 0
COUGH: 0
AGITATION: 0

## 2024-07-11 ASSESSMENT — PATIENT HEALTH QUESTIONNAIRE - PHQ9
2. FEELING DOWN, DEPRESSED OR HOPELESS: NOT AT ALL
1. LITTLE INTEREST OR PLEASURE IN DOING THINGS: NOT AT ALL
SUM OF ALL RESPONSES TO PHQ9 QUESTIONS 1 AND 2: 0

## 2024-07-11 ASSESSMENT — PAIN SCALES - GENERAL: PAINLEVEL: 0-NO PAIN

## 2024-07-11 NOTE — PATIENT INSTRUCTIONS
Focus on healthy eating including lean proteins and vegetables.  Avoid high carbohydrate high sugar foods and drinks.  Try to increase physical activity as tolerated.  Goal is for 160 minutes/week of physical activity.  Check blood pressure 2-3 times a week.  Call for blood pressures greater than 140/90.  Bring list of blood pressures to next visit.  Labwork obtained today.  Will address results when available  Start Lisinopril once a day

## 2024-07-11 NOTE — PROGRESS NOTES
Subjective   Patient ID: Jonh Jack is a 48 y.o. male who presents for [facility].    Patient presents today to [facility] and with concerns for numbness and tingling in his right arm and a difference in size between his right and left arm.  Patient went to the emergency room for this numbness and tingling as well as change in vision.  He was noted to have spinal stenosis.  Patient was prescribed a one week dosage of predisone and a consult with Dr. Rausch who is a spinal orthopedic surgeon.  While in the emergency room he was noted to have elevated blood pressure. Patient is no longer able to hold things for long time and is affecting his work as a  for a [employer].  Blood pressure today initially elevated.  It did decrease after some time being seated and quiet.  After discussion patient and wife note that he has noted to have elevated blood pressure intermittently over the years.  We will initiate antihypertensive therapy and schedule follow-up.  Has an appointment scheduled with orthopedics on July 18.         Review of Systems   Constitutional:  Positive for activity change, appetite change, fatigue and unexpected weight change.   HENT:  Negative for congestion, ear pain, hearing loss and sore throat.    Eyes:  Positive for visual disturbance. Negative for discharge and redness.   Respiratory: Negative.  Negative for cough, chest tightness, shortness of breath and wheezing.    Cardiovascular: Negative.  Negative for chest pain, palpitations and leg swelling.   Gastrointestinal: Negative.  Negative for abdominal pain, blood in stool, constipation and diarrhea.   Endocrine: Negative.    Genitourinary: Negative.  Negative for difficulty urinating, dysuria, frequency, hematuria, penile discharge and testicular pain.   Musculoskeletal:  Negative for arthralgias, joint swelling, neck pain and neck stiffness.   Skin:  Negative for rash.   Allergic/Immunologic: Negative.   "  Neurological:  Positive for weakness. Negative for dizziness, tremors, light-headedness and headaches.   Hematological:  Negative for adenopathy. Does not bruise/bleed easily.   Psychiatric/Behavioral:  Negative for agitation. The patient is not nervous/anxious.        Objective   /82   Pulse 98   Temp 36.8 °C (98.2 °F) (Temporal)   Ht 1.6 m (5' 3\")   Wt 61.7 kg (136 lb)   SpO2 98%   BMI 24.09 kg/m²     Physical Exam  Vitals reviewed.   Constitutional:       General: He is not in acute distress.     Appearance: Normal appearance.   HENT:      Head: Normocephalic.      Right Ear: Tympanic membrane normal.      Left Ear: Tympanic membrane normal.      Nose: Nose normal.      Mouth/Throat:      Mouth: Mucous membranes are moist.   Eyes:      Conjunctiva/sclera: Conjunctivae normal.      Pupils: Pupils are equal, round, and reactive to light.   Cardiovascular:      Rate and Rhythm: Normal rate and regular rhythm.      Pulses: Normal pulses.      Heart sounds: Normal heart sounds.   Pulmonary:      Effort: Pulmonary effort is normal.      Breath sounds: Normal breath sounds.   Abdominal:      General: Bowel sounds are normal.      Palpations: Abdomen is soft.   Musculoskeletal:      Right hand: Decreased range of motion. Decreased strength. Decreased sensation.      Comments: Numbness and tingling in right hand.   Left hand is starting to cause discomfort.    Skin:     General: Skin is warm and dry.      Capillary Refill: Capillary refill takes less than 2 seconds.   Neurological:      Mental Status: He is alert and oriented to person, place, and time.   Psychiatric:         Mood and Affect: Mood normal.         Speech: Speech normal.         Assessment/Plan   Problem List Items Addressed This Visit             ICD-10-CM    Chronic back pain - Primary M54.9, G89.29     Other Visit Diagnoses         Codes    Secondary hypertension     I15.9    Relevant Medications    lisinopril 10 mg tablet    Other Relevant " Orders    CBC and Auto Differential    Comprehensive metabolic panel    Screening for colon cancer     Z12.11    Relevant Orders    Cologuard® colon cancer screening    Hyperlipidemia, unspecified hyperlipidemia type     E78.5    Relevant Orders    Lipid panel    Family history of diabetes mellitus     Z83.3    Relevant Orders    Hemoglobin A1c    Primary hypertension     I10    Relevant Orders    Follow Up In Primary Care - Established          Focus on healthy eating including lean proteins and vegetables.  Avoid high carbohydrate high sugar foods and drinks.  Try to increase physical activity as tolerated.  Goal is for 160 minutes/week of physical activity.  Check blood pressure 2-3 times a week.  Call for blood pressures greater than 140/90.  Bring list of blood pressures to next visit.  Labwork obtained today.  Will address results when available  Start Lisinopril once a day

## 2024-07-12 LAB
EST. AVERAGE GLUCOSE BLD GHB EST-MCNC: 103 MG/DL
HBA1C MFR BLD: 5.2 %

## 2024-07-18 ENCOUNTER — APPOINTMENT (OUTPATIENT)
Dept: ORTHOPEDIC SURGERY | Facility: CLINIC | Age: 49
End: 2024-07-18
Payer: COMMERCIAL

## 2024-07-18 DIAGNOSIS — G56.01 THENAR ATROPHY, RIGHT: Primary | ICD-10-CM

## 2024-07-18 DIAGNOSIS — M48.02 SPINAL STENOSIS, CERVICAL REGION: ICD-10-CM

## 2024-07-18 DIAGNOSIS — M54.12 CERVICAL RADICULOPATHY: ICD-10-CM

## 2024-07-18 PROCEDURE — 99204 OFFICE O/P NEW MOD 45 MIN: CPT | Performed by: PHYSICIAN ASSISTANT

## 2024-07-18 RX ORDER — METHOCARBAMOL 500 MG/1
TABLET, FILM COATED ORAL
Qty: 60 TABLET | Refills: 2 | Status: SHIPPED | OUTPATIENT
Start: 2024-07-18

## 2024-07-18 RX ORDER — MELOXICAM 15 MG/1
15 TABLET ORAL DAILY
Qty: 30 TABLET | Refills: 3 | Status: SHIPPED | OUTPATIENT
Start: 2024-07-18

## 2024-07-18 ASSESSMENT — PAIN - FUNCTIONAL ASSESSMENT: PAIN_FUNCTIONAL_ASSESSMENT: 0-10

## 2024-07-18 ASSESSMENT — PAIN DESCRIPTION - DESCRIPTORS: DESCRIPTORS: ACHING;RADIATING;SPASM

## 2024-07-18 ASSESSMENT — PAIN SCALES - GENERAL: PAINLEVEL_OUTOF10: 7

## 2024-07-18 NOTE — PROGRESS NOTES
Jonh is a 48-year-old right-hand-dominant male reporting clinic today for evaluation of his chronic right arm radiculopathy.    His symptoms have been ongoing for approximately 6 months with progressive worsening.  He denies injury or trauma.  He has pain that starts in his right shoulder and radiates into his hand.  His right hand is weak.  He is frequently dropping things.  He has noticed a change in his handwriting.  He is easily losing function of his right hand.  Minimal left arm symptoms.  He has no neck pain.  He has not noticed any recent change in his balance.  He has full control of his bowel and bladder.    He has a history of a previous L4-5 fusion in 2007.  Current smoker, 1 pack/day.  Family, social, and medical histories are obtained and reviewed.    ROS: All other systems have been reviewed and are negative except as previously noted in history of present illness.    Physical Exam:  Const: Well-appearing, well-nourished male in no distress.  Eyes: Normal appearing sclera and conjunctiva, no jaundice, pupils normal in appearance.  Neck: No masses or lymphadenopathy appreciated.  Resp: breathing comfortably, normal respiratory rate rate.  CV: No upper or lower extremity edema.  Musculoskeletal: Normal gait.  Able to heel/toe walk without difficulty.  Cervical ROM is supple.  Strength exam of the upper extremities reveals 5/5 strength in all major muscle groups with the exception of 2/5 strength of the right intrinsic muscles.  Significant interosseous atrophy on the right.   Neuro: Sensation is intact and equal bilaterally. Deep tendon reflexes are normal and symmetric.  No clonus, negative Orellana sign.  Skin: Intact without any lesions, normal turgor.  Psych: Alert and oriented x3, normal mood and affect.    I personally reviewed the CT scan of the cervical spine from 6/26. There is reversal of the normal cervical lordosis.  There are moderate to advanced degenerative changes with significant  disc space narrowing at C5-6, C6-7, and C7-T1.    The plan moving forward is to further evaluate his ongoing right arm radiculopathy with right hand weakness and muscle atrophy.  An MRI of the cervical spine was ordered today.  Prescriptions for meloxicam 15 mg and methocarbamol 500 mg were sent to his pharmacy for inflammation and muscle spasms.  We discussed the importance of smoking cessation.  He will follow-up with me once the MRI is complete.    **This note was dictated using speech recognition software and was not corrected for spelling or grammatical errors**

## 2024-08-03 ENCOUNTER — HOSPITAL ENCOUNTER (OUTPATIENT)
Dept: RADIOLOGY | Facility: HOSPITAL | Age: 49
Discharge: HOME | End: 2024-08-03
Payer: COMMERCIAL

## 2024-08-03 DIAGNOSIS — M54.12 CERVICAL RADICULOPATHY: ICD-10-CM

## 2024-08-03 DIAGNOSIS — G56.01 THENAR ATROPHY, RIGHT: ICD-10-CM

## 2024-08-03 PROCEDURE — 72141 MRI NECK SPINE W/O DYE: CPT | Performed by: RADIOLOGY

## 2024-08-03 PROCEDURE — 72141 MRI NECK SPINE W/O DYE: CPT

## 2024-08-13 ENCOUNTER — APPOINTMENT (OUTPATIENT)
Dept: PRIMARY CARE | Facility: CLINIC | Age: 49
End: 2024-08-13
Payer: COMMERCIAL

## 2024-08-14 ENCOUNTER — OFFICE VISIT (OUTPATIENT)
Dept: PRIMARY CARE | Facility: CLINIC | Age: 49
End: 2024-08-14
Payer: COMMERCIAL

## 2024-08-14 VITALS
HEIGHT: 63 IN | HEART RATE: 96 BPM | WEIGHT: 134.8 LBS | DIASTOLIC BLOOD PRESSURE: 74 MMHG | SYSTOLIC BLOOD PRESSURE: 130 MMHG | TEMPERATURE: 98.9 F | BODY MASS INDEX: 23.88 KG/M2 | OXYGEN SATURATION: 96 %

## 2024-08-14 DIAGNOSIS — M48.02 CERVICAL STENOSIS OF SPINE: ICD-10-CM

## 2024-08-14 DIAGNOSIS — M54.9 CHRONIC BACK PAIN, UNSPECIFIED BACK LOCATION, UNSPECIFIED BACK PAIN LATERALITY: Primary | ICD-10-CM

## 2024-08-14 DIAGNOSIS — S22.000S COMPRESSION FRACTURE OF THORACIC VERTEBRA, UNSPECIFIED THORACIC VERTEBRAL LEVEL, SEQUELA: ICD-10-CM

## 2024-08-14 DIAGNOSIS — K59.03 DRUG-INDUCED CONSTIPATION: ICD-10-CM

## 2024-08-14 DIAGNOSIS — G89.29 CHRONIC BACK PAIN, UNSPECIFIED BACK LOCATION, UNSPECIFIED BACK PAIN LATERALITY: Primary | ICD-10-CM

## 2024-08-14 PROCEDURE — 3008F BODY MASS INDEX DOCD: CPT | Performed by: NURSE PRACTITIONER

## 2024-08-14 PROCEDURE — 99214 OFFICE O/P EST MOD 30 MIN: CPT | Performed by: NURSE PRACTITIONER

## 2024-08-14 RX ORDER — PREDNISONE 50 MG/1
50 TABLET ORAL DAILY
Qty: 5 TABLET | Refills: 0 | Status: SHIPPED | OUTPATIENT
Start: 2024-08-14 | End: 2024-08-19

## 2024-08-14 RX ORDER — CYCLOBENZAPRINE HCL 10 MG
10 TABLET ORAL 3 TIMES DAILY PRN
Qty: 60 TABLET | Refills: 0 | Status: SHIPPED | OUTPATIENT
Start: 2024-08-14 | End: 2024-10-13

## 2024-08-14 RX ORDER — AMOXICILLIN 250 MG
1 CAPSULE ORAL DAILY
Qty: 30 TABLET | Refills: 11 | Status: SHIPPED | OUTPATIENT
Start: 2024-08-14 | End: 2025-08-14

## 2024-08-14 ASSESSMENT — PATIENT HEALTH QUESTIONNAIRE - PHQ9
10. IF YOU CHECKED OFF ANY PROBLEMS, HOW DIFFICULT HAVE THESE PROBLEMS MADE IT FOR YOU TO DO YOUR WORK, TAKE CARE OF THINGS AT HOME, OR GET ALONG WITH OTHER PEOPLE: NOT DIFFICULT AT ALL
SUM OF ALL RESPONSES TO PHQ9 QUESTIONS 1 AND 2: 1
2. FEELING DOWN, DEPRESSED OR HOPELESS: SEVERAL DAYS
1. LITTLE INTEREST OR PLEASURE IN DOING THINGS: NOT AT ALL

## 2024-08-14 ASSESSMENT — ENCOUNTER SYMPTOMS
JOINT SWELLING: 0
ALLERGIC/IMMUNOLOGIC NEGATIVE: 1
ADENOPATHY: 0
DEPRESSION: 0
LOSS OF SENSATION IN FEET: 0
WHEEZING: 0
GASTROINTESTINAL NEGATIVE: 1
APPETITE CHANGE: 0
DIARRHEA: 0
NUMBNESS: 1
PALPITATIONS: 0
CARDIOVASCULAR NEGATIVE: 1
FREQUENCY: 0
OCCASIONAL FEELINGS OF UNSTEADINESS: 0
EYE DISCHARGE: 0
SHORTNESS OF BREATH: 0
BLOOD IN STOOL: 0
NERVOUS/ANXIOUS: 0
EYE REDNESS: 0
WEAKNESS: 1
NECK PAIN: 0
TREMORS: 0
DIFFICULTY URINATING: 0
ACTIVITY CHANGE: 0
DIZZINESS: 0
COUGH: 0
ABDOMINAL PAIN: 0
LIGHT-HEADEDNESS: 0
CONSTIPATION: 0
HEMATURIA: 0
BACK PAIN: 1
SORE THROAT: 0
RESPIRATORY NEGATIVE: 1
AGITATION: 0
ENDOCRINE NEGATIVE: 1
DYSURIA: 0
ARTHRALGIAS: 1
HEADACHES: 0
EYES NEGATIVE: 1
BRUISES/BLEEDS EASILY: 0
CHEST TIGHTNESS: 0

## 2024-08-14 ASSESSMENT — LIFESTYLE VARIABLES: HOW MANY STANDARD DRINKS CONTAINING ALCOHOL DO YOU HAVE ON A TYPICAL DAY: PATIENT DOES NOT DRINK

## 2024-08-14 ASSESSMENT — PAIN SCALES - GENERAL: PAINLEVEL: 6

## 2024-08-14 NOTE — PATIENT INSTRUCTIONS
Try Flexeril up to 3 times a day as needed for muscle spasms.  Do not drive or operate machinery as this medication can make you sleepy  Start prednisone one tablet once a day for 5 days.  Sometimes prednisone can make people anxious or irritable and some people have increased hunger  Heat or ice or alternate as needed for discomfort  Do not sleep with heating pad  Follow up with Orthopedics as scheduled

## 2024-08-14 NOTE — PROGRESS NOTES
Subjective   Patient ID: Jonh Jack is a 48 y.o. male who presents for Annual Exam (Physical Exam.).    Presents today for annual physical exam and with concerns for lower back and right shoulder and arm pain and numbness.  He has been seen by orthopedics.  He received an MRI which showed some bulging disks as well as some stenosis.  He has a follow-up appointment on September 3.  He reports his pain is so severe he is not able to sleep he has difficulty ambulating he has not been able to work.  He reports he has tried naproxen and tizanidine with minimal improvement in symptoms.  He also notes that he takes the naproxen he has nausea.  He reports he cannot take ibuprofen due to the same thing. He reports Robaxin was not effective either.       Review of Systems   Constitutional:  Negative for activity change and appetite change.   HENT:  Negative for congestion, ear pain, hearing loss and sore throat.    Eyes: Negative.  Negative for discharge, redness and visual disturbance.   Respiratory: Negative.  Negative for cough, chest tightness, shortness of breath and wheezing.    Cardiovascular: Negative.  Negative for chest pain, palpitations and leg swelling.   Gastrointestinal: Negative.  Negative for abdominal pain, blood in stool, constipation and diarrhea.   Endocrine: Negative.    Genitourinary: Negative.  Negative for difficulty urinating, dysuria, frequency, hematuria, penile discharge and testicular pain.   Musculoskeletal:  Positive for arthralgias, back pain and gait problem. Negative for joint swelling and neck pain.   Skin:  Negative for rash.   Allergic/Immunologic: Negative.    Neurological:  Positive for weakness and numbness. Negative for dizziness, tremors, light-headedness and headaches.   Hematological:  Negative for adenopathy. Does not bruise/bleed easily.   Psychiatric/Behavioral:  Negative for agitation. The patient is not nervous/anxious.        Objective   /74    "Pulse 96   Temp 37.2 °C (98.9 °F) (Temporal)   Ht 1.6 m (5' 3\")   Wt 61.1 kg (134 lb 12.8 oz)   SpO2 96%   BMI 23.88 kg/m²     Physical Exam  Constitutional:       Appearance: Normal appearance.   HENT:      Head: Normocephalic.   Eyes:      Conjunctiva/sclera: Conjunctivae normal.   Cardiovascular:      Rate and Rhythm: Normal rate and regular rhythm.      Heart sounds: Normal heart sounds.   Pulmonary:      Effort: Pulmonary effort is normal.      Breath sounds: Normal breath sounds.   Abdominal:      General: Bowel sounds are normal.      Palpations: Abdomen is soft.   Musculoskeletal:         General: Normal range of motion.   Skin:     General: Skin is warm and dry.      Capillary Refill: Capillary refill takes less than 2 seconds.   Neurological:      General: No focal deficit present.      Mental Status: He is alert and oriented to person, place, and time.      Cranial Nerves: Cranial nerve deficit present.      Motor: Weakness present.      Gait: Gait abnormal.   Psychiatric:         Mood and Affect: Mood normal.         Speech: Speech normal.         Assessment/Plan   Problem List Items Addressed This Visit             ICD-10-CM    Chronic back pain - Primary M54.9, G89.29    Relevant Medications    cyclobenzaprine (Flexeril) 10 mg tablet    predniSONE (Deltasone) 50 mg tablet    Other Relevant Orders    Follow Up In Primary Care - Established    Compression fracture of thoracic vertebra (Multi) S22.000A     Other Visit Diagnoses         Codes    Cervical stenosis of spine     M48.02    Relevant Medications    cyclobenzaprine (Flexeril) 10 mg tablet    predniSONE (Deltasone) 50 mg tablet    Other Relevant Orders    Follow Up In Primary Care - Established    Drug-induced constipation     K59.03    Relevant Medications    sennosides-docusate sodium (Barbara-Colace) 8.6-50 mg tablet          Try Flexeril up to 3 times a day as needed for muscle spasms.  Do not drive or operate machinery as this medication " can make you sleepy  Start prednisone one tablet once a day for 5 days.  Sometimes prednisone can make people anxious or irritable and some people have increased hunger  Heat or ice or alternate as needed for discomfort  Do not sleep with heating pad  Follow up with Orthopedics as scheduled

## 2024-08-15 ENCOUNTER — TELEPHONE (OUTPATIENT)
Dept: PRIMARY CARE | Facility: CLINIC | Age: 49
End: 2024-08-15
Payer: COMMERCIAL

## 2024-08-15 NOTE — TELEPHONE ENCOUNTER
CALL TO PT MADE DUE TO NO SHOW APPOINTMENT ON 08/13/2024. PT STATED HE DIDN'T REALIZE HIS APPOINTMENT WAS ON TUESDAY. HE THOUGHT THE APPOINTMENT WAS SCHEDULED FOR THURSDAY THAT'S WHY HE NO SHOWED ON 08/13/2024. PT WAS SEEN ON 08/14/2024. THIS IS PTS SECOND NO SHOW. PT HAS NO SHOW DATES OF 02/01/2024 AND 08/13/2024.

## 2024-08-30 DIAGNOSIS — I15.9 SECONDARY HYPERTENSION: ICD-10-CM

## 2024-09-03 RX ORDER — LISINOPRIL 10 MG/1
10 TABLET ORAL DAILY
Qty: 30 TABLET | Refills: 3 | Status: SHIPPED | OUTPATIENT
Start: 2024-09-03

## 2024-09-03 RX ORDER — OXYCODONE HYDROCHLORIDE AND ACETAMINOPHEN 10; 325 MG/1; MG/1
TABLET ORAL EVERY 12 HOURS
COMMUNITY
Start: 2024-08-26

## 2024-09-04 ENCOUNTER — OFFICE VISIT (OUTPATIENT)
Dept: ORTHOPEDIC SURGERY | Facility: CLINIC | Age: 49
End: 2024-09-04
Payer: COMMERCIAL

## 2024-09-04 VITALS — BODY MASS INDEX: 23.74 KG/M2 | WEIGHT: 134 LBS | HEIGHT: 63 IN

## 2024-09-04 DIAGNOSIS — M54.12 CERVICAL RADICULOPATHY: Primary | ICD-10-CM

## 2024-09-04 PROCEDURE — 3008F BODY MASS INDEX DOCD: CPT | Performed by: PHYSICIAN ASSISTANT

## 2024-09-04 PROCEDURE — 99214 OFFICE O/P EST MOD 30 MIN: CPT | Performed by: PHYSICIAN ASSISTANT

## 2024-09-04 ASSESSMENT — PAIN SCALES - GENERAL: PAINLEVEL_OUTOF10: 5 - MODERATE PAIN

## 2024-09-04 ASSESSMENT — PAIN - FUNCTIONAL ASSESSMENT: PAIN_FUNCTIONAL_ASSESSMENT: 0-10

## 2024-09-04 NOTE — PROGRESS NOTES
Jonh returns to clinic today to review his MRI.    He has pain that starts in his right shoulder and radiates into his hand.  His right hand is weak.  He is frequently dropping things.  He has noticed a change in his handwriting.  He is progressively losing function of his right hand.  Minimal left arm symptoms.  He has no neck pain.  He has not noticed any recent change in his balance.  He has full control of his bowel and bladder.    He has a history of a previous L4-5 fusion in 2007.  Current smoker, 1 pack/day.  He is still smoking.  He has all of the necessary things to quit he just has not started.  He is going to use nicotine patches and gum.    ROS: All other systems have been reviewed and are negative except as previously noted in history of present illness.    Physical Exam:  Const: Well-appearing, well-nourished male in no distress.  Eyes: Normal appearing sclera and conjunctiva, no jaundice, pupils normal in appearance.  Neck: No masses or lymphadenopathy appreciated.  Resp: breathing comfortably, normal respiratory rate rate.  CV: No upper or lower extremity edema.  Musculoskeletal: Normal gait.  Able to heel/toe walk without difficulty.  Cervical ROM is supple.  Strength exam of the upper extremities reveals 5/5 strength in all major muscle groups with the exception of 1/5 strength of the right intrinsic muscles.  Significant interosseous atrophy on the right.   Neuro: Sensation is intact and equal bilaterally. Deep tendon reflexes are normal and symmetric.  No clonus, negative Orellana sign.  Skin: Intact without any lesions, normal turgor.  Psych: Alert and oriented x3, normal mood and affect.    I personally reviewed an MRI cervical spine from 8/3.  There is no evidence of high-grade central canal stenosis or spinal cord compression.  There is severe right foraminal stenosis at C5-6.    I had a long discussion with him about his symptoms and MRI results.  I do think he would benefit from  surgical intervention.  He needs to quit smoking and be nicotine free prior to surgery.  He states he is going to quit today.  In the meantime he was given referrals for physical therapy and pain management.  He is going to let me know when he is nicotine free and we will order a nicotine metabolite test.    **This note was dictated using speech recognition software and was not corrected for spelling or grammatical errors**

## 2024-09-24 ENCOUNTER — APPOINTMENT (OUTPATIENT)
Dept: PAIN MEDICINE | Facility: CLINIC | Age: 49
End: 2024-09-24
Payer: COMMERCIAL

## 2024-09-24 VITALS — SYSTOLIC BLOOD PRESSURE: 127 MMHG | OXYGEN SATURATION: 98 % | DIASTOLIC BLOOD PRESSURE: 79 MMHG | HEART RATE: 96 BPM

## 2024-09-24 DIAGNOSIS — M54.12 CERVICAL RADICULOPATHY: Primary | ICD-10-CM

## 2024-09-24 DIAGNOSIS — G56.21 ULNAR NEUROPATHY OF RIGHT UPPER EXTREMITY: ICD-10-CM

## 2024-09-24 PROCEDURE — 99204 OFFICE O/P NEW MOD 45 MIN: CPT | Performed by: PHYSICAL MEDICINE & REHABILITATION

## 2024-09-24 RX ORDER — DIAZEPAM 5 MG/1
5 TABLET ORAL ONCE AS NEEDED
OUTPATIENT
Start: 2024-09-24

## 2024-09-24 RX ORDER — GABAPENTIN 300 MG/1
CAPSULE ORAL
Qty: 180 CAPSULE | Refills: 2 | Status: SHIPPED | OUTPATIENT
Start: 2024-09-24

## 2024-09-24 ASSESSMENT — ENCOUNTER SYMPTOMS
NECK PAIN: 1
TROUBLE SWALLOWING: 0
FATIGUE: 1
SHORTNESS OF BREATH: 0
DYSURIA: 0
WEAKNESS: 1
DIZZINESS: 1
DIFFICULTY URINATING: 0

## 2024-09-24 ASSESSMENT — PAIN SCALES - GENERAL: PAINLEVEL_OUTOF10: 6

## 2024-09-24 ASSESSMENT — PAIN - FUNCTIONAL ASSESSMENT: PAIN_FUNCTIONAL_ASSESSMENT: 0-10

## 2024-09-24 NOTE — PROGRESS NOTES
"Subjective   Patient ID: Jonh Jack \"Jonh Jack\" is a 48 y.o. right-handed male who presents for neck and right upper extremity pain/weakness. He states the neck pain is mild but arm symptoms are severe particularly in elbow and shoulder. He started noticing symptoms a little over a year ago with hand numbness and has evolved to numbness, weakness, and pain.    He was seen by neurosurgery and noted that he would benefit from surgical intervention but was told he needed PT first. Patient states he is unable to do PT because moving his arm hurts too much and prevents him from sleeping more than usual.     He has had 4 lumbar surgeries in the past and it still gives him trouble.      Neck Disability Index  Section 1: Pain Intensity: The pain is moderate at the moment  Section 2: Personal Care (Washing, Dressing, etc.): I need some help but can manage most of my personal care  Section 3: Lifting: I can only lift very light weights  Section 4: Reading: I can read as much as I want to with slight pain in my neck  Section 5: Headaches: I have no headaches at all  Section 6: Concentration: I can concentrate fully when I want to with slight difficulty  Section 7: Work: I can hardly do any work at all  Section 8: Driving: I can drive my car without any neck pain  Section 9: Sleeping: My sleep is greatly disturbed (3-5 hours sleepless)  Section 10: Recreation: I can hardly do any recreation activities because of pain in my neck  Neck Disability Index Raw Score: 23             Monitoring and compliance:    ORT:    PDUQ:    Office Agreement:      Review of Systems   Constitutional:  Positive for fatigue.   HENT:  Negative for trouble swallowing.    Eyes:  Negative for visual disturbance.   Respiratory:  Negative for shortness of breath.    Genitourinary:  Negative for difficulty urinating and dysuria.   Musculoskeletal:  Positive for neck pain.   Neurological:  Positive for dizziness and weakness.    "     Current Outpatient Medications   Medication Instructions    cyclobenzaprine (FLEXERIL) 10 mg, oral, 3 times daily PRN    gabapentin (Neurontin) 300 mg capsule Day 1 Take 300mg at bedtime, Day 2 300mg BID, Day 3 300mg TID, Day 4-6 300mg AM 300mg afternoon 600mg at bedtime, Day 7-9 300mg Am, 600mg afternoon, 600mg at bedtime, Day 10 and after 600mg TID.    lisinopril 10 mg, oral, Daily    meloxicam (MOBIC) 15 mg, oral, Daily    naproxen (NAPROSYN) 500 mg, oral, 2 times daily (morning and late afternoon)    Percocet  mg tablet Every 12 hours    sennosides-docusate sodium (Barbara-Colace) 8.6-50 mg tablet 1 tablet, oral, Daily        Past Medical History:   Diagnosis Date    Alcohol dependence, in remission (Multi) 06/22/2020    Alcohol use disorder, moderate, in early remission    Other conditions influencing health status 08/03/2016    Sprain and strain of shoulder and upper arm    Other specified postprocedural states 01/06/2021    Status post ORIF of fracture of ankle    Otitis media, unspecified, left ear 02/14/2018    Acute left otitis media    Pain in unspecified shoulder 08/09/2017    Shoulder pain    Personal history of (healed) traumatic fracture 01/06/2021    History of fracture of left ankle    Personal history of other diseases of the musculoskeletal system and connective tissue 02/14/2018    History of neck pain    Personal history of other diseases of the musculoskeletal system and connective tissue 10/17/2014    History of low back pain    Personal history of other diseases of the respiratory system 02/14/2018    History of acute bronchitis    Personal history of other specified conditions 02/15/2019    History of palpitations    Unspecified injury of left ankle, initial encounter 11/18/2020    Left ankle injury        Past Surgical History:   Procedure Laterality Date    BACK SURGERY  05/06/2014    Back Surgery    OTHER SURGICAL HISTORY  06/22/2020    [place] tooth extraction    ROTATOR CUFF  REPAIR  05/06/2014    Rotator Cuff Repair        Family History   Problem Relation Name Age of Onset    COPD Mother          No Known Allergies     Objective     Vitals:    09/24/24 0917   BP: 127/79   Pulse: 96   SpO2: 98%        Physical Exam    GENERAL EXAM  Vital Signs: Vital signs to include heart rate, respiration rate, blood pressure, and temperature were reviewed.  General Appearance:  Awake, alert, healthy appearing, well developed, No acute distress.  Head: Normocephalic without evidence of head injury.  Neck: The appearance of the neck was normal without swelling with a midline trachea.  Eyes: The eyelids and eyebrows exhibited no abnormalities.  Pupils were not pin-point.  Sclera was without icterus.  Lungs: Respiration rhythm and depth was normal.  Respiratory movements were normal without labored breathing.  Cardiovascular: No peripheral edema was present.    Neurological: Patient was oriented to time, place, and person.  Speech was normal.  Balance, gait, and stance were unremarkable.    Psychiatric: Appearance was normal with appropriate dress.  Mood was euthymic and affect was normal.  Skin: Affected regions were without ecchymosis or skin lesions.        Physical exam as above except:  Right FDI atrophy, shoulder range of motion intact with mild end range tenderness. Tinels at elbow positive.  Cervical range of motion within normal limits. Spurlings to right caused pain in neck but no upper extremity symptoms. Mild tenderness to bilateral cervical paraspinals.  Strength: LUE 5/5, RUE EF 4/5, EE 5/5, WE 4/5, FF 4/5, FA 3/5.  Decreased sensation over right C6/7/8 dermatomes to light touch.  DTR 2+ bilateral upper extremities. Fohman's sign negative      MRI from 7/18/24 reviewed: shows degenerative changes throughout cervical spine with right foraminal narrowing at C5-C6 and C6-C7    Assessment/Plan   Diagnoses and all orders for this visit:  Cervical radiculopathy  -     Referral to Pain Medicine  -      EMG & nerve conduction; Future  -     gabapentin (Neurontin) 300 mg capsule; Day 1 Take 300mg at bedtime, Day 2 300mg BID, Day 3 300mg TID, Day 4-6 300mg AM 300mg afternoon 600mg at bedtime, Day 7-9 300mg Am, 600mg afternoon, 600mg at bedtime, Day 10 and after 600mg TID.  -     FL pain management; Future  -     Epidural Steroid Injection; Future  Ulnar neuropathy of right upper extremity  -     EMG & nerve conduction; Future  -     gabapentin (Neurontin) 300 mg capsule; Day 1 Take 300mg at bedtime, Day 2 300mg BID, Day 3 300mg TID, Day 4-6 300mg AM 300mg afternoon 600mg at bedtime, Day 7-9 300mg Am, 600mg afternoon, 600mg at bedtime, Day 10 and after 600mg TID.  Other orders  -     NPO Diet Except: Sips with meds; Effective now; Standing  -     Height and weight; Standing  -     Insert and maintain peripheral IV; Standing  -     Saline lock IV; Standing  -     Type And Screen; Standing  -     diazePAM (Valium) tablet 5 mg  -     Adult diet Regular; Standing  -     Vital Signs; Standing  -     Notify physician - Standard Parameters; Standing  -     Prior to Discharge O2 Weaning; Standing  -     Pulse oximetry, continuous; Standing  -     Discharge patient; Standing    48 year old male presents for initial evaluation of right upper extremity pain. His symptoms are consistent with a C5/6 radiculopathy that are consistent with his degenerative changes at C5-6.  However patient also likely has a significant ulnar neuropathy at the elbow causing the hand weakness.   - Gabapentin titration starting with 300mg nightly up to 600mg TID.  - EMG to evaluate for ulnar neuropathy across the elbow versus contribution from cervical radiculopathy.  - As patient has been unable to tolerate PT, plan for cervical epidural steroid injection at C7-T1 to decrease inflammation so patient can tolerate PT. Patient to schedule therapy for after injection.  We discussed the risks, benefits and alternatives to the procedure and the patient  would like to proceed       Olvin Holland DO, MBA, PGY-3  Physical Medicine and Rehabilitation      I saw and evaluated the patient. I personally obtained the key and critical portions of the history and physical exam or was physically present for key and critical portions performed by the resident/fellow. I reviewed the resident/fellow's documentation and discussed the patient with the resident/fellow. I agree with the resident/fellow's medical decision making as documented in the note.    Christophe Gutierrez MD

## 2024-11-18 DIAGNOSIS — M54.12 CERVICAL RADICULOPATHY: ICD-10-CM

## 2024-11-18 DIAGNOSIS — G56.01 THENAR ATROPHY, RIGHT: ICD-10-CM

## 2024-11-18 RX ORDER — MELOXICAM 15 MG/1
15 TABLET ORAL DAILY
Qty: 30 TABLET | Refills: 3 | Status: SHIPPED | OUTPATIENT
Start: 2024-11-18

## 2025-02-14 ENCOUNTER — APPOINTMENT (OUTPATIENT)
Dept: PRIMARY CARE | Facility: CLINIC | Age: 50
End: 2025-02-14
Payer: COMMERCIAL

## 2025-03-07 DIAGNOSIS — M48.02 CERVICAL STENOSIS OF SPINE: ICD-10-CM

## 2025-03-07 DIAGNOSIS — G89.29 CHRONIC BACK PAIN, UNSPECIFIED BACK LOCATION, UNSPECIFIED BACK PAIN LATERALITY: ICD-10-CM

## 2025-03-07 DIAGNOSIS — M54.9 CHRONIC BACK PAIN, UNSPECIFIED BACK LOCATION, UNSPECIFIED BACK PAIN LATERALITY: ICD-10-CM

## 2025-03-07 RX ORDER — PREGABALIN 75 MG/1
CAPSULE ORAL
COMMUNITY
Start: 2025-02-27

## 2025-03-07 RX ORDER — CYCLOBENZAPRINE HCL 10 MG
10 TABLET ORAL 3 TIMES DAILY PRN
Qty: 60 TABLET | Refills: 0 | Status: SHIPPED | OUTPATIENT
Start: 2025-03-07